# Patient Record
Sex: FEMALE | Race: WHITE | NOT HISPANIC OR LATINO
[De-identification: names, ages, dates, MRNs, and addresses within clinical notes are randomized per-mention and may not be internally consistent; named-entity substitution may affect disease eponyms.]

---

## 2017-12-31 ENCOUNTER — HEALTH MAINTENANCE LETTER (OUTPATIENT)
Age: 22
End: 2017-12-31

## 2018-11-10 ASSESSMENT — ENCOUNTER SYMPTOMS
NECK PAIN: 0
SORE THROAT: 1
TROUBLE SWALLOWING: 0
MYALGIAS: 0
DYSPNEA ON EXERTION: 1
COUGH: 1
ARTHRALGIAS: 1
MUSCLE CRAMPS: 0
MUSCLE WEAKNESS: 0
SPUTUM PRODUCTION: 1
HEMOPTYSIS: 0
WHEEZING: 1
TASTE DISTURBANCE: 0
POSTURAL DYSPNEA: 0
COUGH DISTURBING SLEEP: 1
SMELL DISTURBANCE: 0
HOARSE VOICE: 0
JOINT SWELLING: 1
NECK MASS: 0
SINUS CONGESTION: 1
STIFFNESS: 1
BACK PAIN: 0
SINUS PAIN: 0
SHORTNESS OF BREATH: 1
SNORES LOUDLY: 0

## 2018-11-13 ENCOUNTER — OFFICE VISIT (OUTPATIENT)
Dept: ORTHOPEDICS | Facility: CLINIC | Age: 23
End: 2018-11-13
Payer: COMMERCIAL

## 2018-11-13 ENCOUNTER — RADIANT APPOINTMENT (OUTPATIENT)
Dept: GENERAL RADIOLOGY | Facility: CLINIC | Age: 23
End: 2018-11-13
Attending: ORTHOPAEDIC SURGERY
Payer: COMMERCIAL

## 2018-11-13 VITALS — HEIGHT: 66 IN | WEIGHT: 132.2 LBS | BODY MASS INDEX: 21.24 KG/M2

## 2018-11-13 DIAGNOSIS — M25.562 CHRONIC PAIN OF LEFT KNEE: ICD-10-CM

## 2018-11-13 DIAGNOSIS — M25.362 PATELLOFEMORAL INSTABILITY OF LEFT KNEE WITH PAIN: Primary | ICD-10-CM

## 2018-11-13 DIAGNOSIS — G89.29 CHRONIC PAIN OF LEFT KNEE: ICD-10-CM

## 2018-11-13 DIAGNOSIS — M25.562 PATELLOFEMORAL INSTABILITY OF LEFT KNEE WITH PAIN: Primary | ICD-10-CM

## 2018-11-13 DIAGNOSIS — M25.562 CHRONIC PAIN OF LEFT KNEE: Primary | ICD-10-CM

## 2018-11-13 DIAGNOSIS — G89.29 CHRONIC PAIN OF LEFT KNEE: Primary | ICD-10-CM

## 2018-11-13 RX ORDER — METHIMAZOLE 5 MG/1
5 TABLET ORAL 2 TIMES DAILY
COMMUNITY
Start: 2016-09-16

## 2018-11-13 NOTE — LETTER
11/13/2018       RE: Gregoria Sung  [address] [facility] [address]     Dear Colleague,    Thank you for referring your patient, Gregoria Sung, to the [facility]. Please see a copy of my visit note below.    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  Bilateral patellar instability  2.  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  Patella shavonne  4.  Tobacco use    Plan:  Recommend surgery for patellar instability: MPFL reconstruction left knee, possible MPTL reconstruction   At this time, given her occupation, activity level and borderline shavonne/hyperextension, consideration for distal Tibial tubercle Osteotomy can be given, but doing a smaller surgery given the complexity of her home situation ( mother of a 1 year ole, in nursing school, etc); we believe a smaller surgery is best to do given its high chance of success in this setting.    Heavy counseling on smoking cessation; nicotine testing prior to surgery  Discussed risks and benefits of surgery including ~10% risk of re-dislocation in someone with hyperlaxity. Patient wishes to proceed.     Room time: 30 min, Consultation time: 20 min, mostly spent discussing the diagnosis and surgical recommendation, including potential complications and time line for return to function.    The patient was seen and examined by Dr. Teran who agrees with the plan above; seen for 30 minutes, greater of which 50% was spent in counseling the patient    Jorje Cortes MD 11/13/2018  Orthopaedic Surgery Resident, PGY-4  Pager: [phone]    I have  personally examined this patient and have reviewed the clinical presentation and progress note with the resident.  I agree with the treatment plan as outlined.  The plan was formulated with the resident on the day of the resident dictation.    Cayla Teran              History of Present Illness:   Patient was seen and examined by me. History, PMH, Meds, SH, complete ROS (10 organ systems) and PE reviewed with patient and prior medical records.      23-year-old female patient with a past medical history of Mohinder Danlos hyperlaxity syndrome, tobacco use disorder, and hypothyroidism who presents with a long-standing history of bilateral patellofemoral instability most symptom attic on her left side.  She is known to Dr. Teran for presentation back in  at which time she was scheduled to have M PFL reconstruction and possible M PTL reconstruction but had to be canceled.  She had a child around that time and out the child is old enough to walk she feels that she is ready to undergo the intended surgery.  She says now that her symptoms on the left thyroid have gotten worse and she has subluxation type instability events on a daily basis even during modest tasks.  She reports that the symptoms are also present on her right side but to a much lesser degree.  She does continue to smoke but in preparation for surgery she has already started to wean down.           Past Medical History:     Past Medical History:   Diagnosis Date     Complication of anesthesia 2016     Scoliosis      Tobacco use  HYPERthyroid  Hyperlaxity          Past Surgical History:     Past Surgical History:   Procedure Laterality Date     C STOMACH SURGERY PROCEDURE UNLISTED  2010 & 2013    Appendectomy and      KNEE SURGERY  2010    Lateral Release on left knee             Social History:     Social History     Social History     Marital status:      Spouse name: N/A     Number of children: N/A     Years of  education: N/A     Social History Main Topics     Smoking status: Current Some Day Smoker     Packs/day: 0.50     Years: 5.00     Types: Cigarettes     Start date: 8/3/2012     Smokeless tobacco: Not on file     Alcohol use 0.0 oz/week      Comment: Maybe once a month     Drug use: No     Sexual activity: Yes     Partners: Male     Birth control/ protection: None     Other Topics Concern     Not on file     Social History Narrative             Family History:     Family History   Problem Relation Age of Onset     Diabetes Maternal Grandmother      Diabetes Paternal Grandmother      Diabetes Father      Coronary Artery Disease Maternal Grandmother      Coronary Artery Disease Maternal Grandfather      Hypertension Mother      Hypertension Maternal Grandmother      Hypertension Maternal Grandfather      Hyperlipidemia Maternal Grandmother      Hyperlipidemia Maternal Grandfather      Breast Cancer Paternal Grandmother      Prostate Cancer Other      Great Grandmother     Other Cancer Mother      Cervical     Other Cancer Maternal Grandmother      Cervical     Other Cancer Other      Great Grandmother- Lung     Other Cancer Other      Great Grandmother-Leukemia     Anxiety Disorder Mother      Mental Illness Mother      Mental Illness Maternal Grandmother      Asthma Maternal Grandmother      Asthma Maternal Grandfather      Thyroid Disease Mother      Obesity Mother      Obesity Maternal Grandmother      Obesity Maternal Grandfather      Depression Maternal Grandfather      Migraines Mother      Rheumatoid Arthritis Other      Uncle     Hypothyroidism Mother      Low Back Problems Maternal Grandmother      Low Back Problems Maternal Grandfather      Spine Problems Father      Scoliosis     Spine Problems Mother      Scoliosis              Medications:     Current Outpatient Prescriptions   Medication Sig     levonorgestrel (MIRENA, 52 MG,) 20 MCG/24HR IUD      methimazole (TAPAZOLE) 5 MG tablet Take 5 mg by mouth 2  "times daily     Omeprazole (PRILOSEC PO)      No current facility-administered medications for this visit.              Allergies:      Allergies   Allergen Reactions     Iodine      Latex             Review of Systems:   A comprehensive 10 point review of systems (constitutional, ENT, cardiac, peripheral vascular, respiratory, GI, , Musculoskeletal, skin, Neurological) was performed and found to be negative except as described in this note.           Physical Exam:   COMPLETE EXAMINATION:   VITAL SIGNS: Ht 1.676 m (5' 6\")  Wt 60 kg (132 lb 3.2 oz)  BMI 21.34 kg/m2   GEN: NAD, smells of tobacco smoke  HEENT: poor dentition  RESP: Non labored breathing  SKIN: Grossly normal   MUSCULOSKELETAL:     Gait: patient walks with nonantalgic heel-toe gait.   They do achieve full extension at the knee. Hyper extension    left painful  Knee  Overall limb alignment is: Neutral  Effusion or swelling of the knee: none  Tenderness to palpation: yes, pain localizes to the patella  ROM:    Left: -12 to 155   Right: -14 to 155  Pain with knee ROM: mild  Crepitance with knee ROM: none  Extensor lag: none  MCL stability: Stable  Lateral Stability: Stable  Lachman: deferred  Posterior stability: stable  Pain with passive full hip range of motion: none  Patellar translation: 3+ laterally  Apprehension: appreciated  Medial patella tilt: mild  J-sign: soft J tracking noticeable    Prior surgical incision: none    Beighton criteria 9 of 9              Data:   3 view standing x-rays of the left knee and review of MRI of the left knee from 2016 demonstrate a 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  She is a CD ratio of 1.3 and modified I-S ratio of 1.0.  He has a well-maintained patellofemoral joint on x-ray.      Again, thank you for allowing me to participate in the care of your patient.      Sincerely,    Cayla Teran MD      "

## 2018-11-13 NOTE — PROGRESS NOTES
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  Bilateral patellar instability  2.  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  Patella shavonne  4.  Tobacco use    Plan:  Recommend surgery for patellar instability: MPFL reconstruction left knee, possible MPTL reconstruction   At this time, given her occupation, activity level and borderline shavonne/hyperextension, consideration for distal Tibial tubercle Osteotomy can be given, but doing a smaller surgery given the complexity of her home situation ( mother of a 1 year ole, in nursing school, etc); we believe a smaller surgery is best to do given its high chance of success in this setting.    Heavy counseling on smoking cessation; nicotine testing prior to surgery  Discussed risks and benefits of surgery including ~10% risk of re-dislocation in someone with hyperlaxity. Patient wishes to proceed.     Room time: 30 min, Consultation time: 20 min, mostly spent discussing the diagnosis and surgical recommendation, including potential complications and time line for return to function.    The patient was seen and examined by Dr. Teran who agrees with the plan above; seen for 30 minutes, greater of which 50% was spent in counseling the patient    Jorje Cortes MD 11/13/2018  Orthopaedic Surgery Resident, PGY-4  Pager: [phone]    I have personally examined this patient and have reviewed the clinical presentation and progress note with the resident.  I agree with the treatment plan as outlined.  The plan was formulated with the resident on the day of the resident dictation.    Cayla Teran              History of Present  Illness:   Patient was seen and examined by me. History, PMH, Meds, SH, complete ROS (10 organ systems) and PE reviewed with patient and prior medical records.      23-year-old female patient with a past medical history of Mohinder Danlos hyperlaxity syndrome, tobacco use disorder, and hypothyroidism who presents with a long-standing history of bilateral patellofemoral instability most symptom attic on her left side.  She is known to Dr. Teran for presentation back in  at which time she was scheduled to have M PFL reconstruction and possible M PTL reconstruction but had to be canceled.  She had a child around that time and out the child is old enough to walk she feels that she is ready to undergo the intended surgery.  She says now that her symptoms on the left thyroid have gotten worse and she has subluxation type instability events on a daily basis even during modest tasks.  She reports that the symptoms are also present on her right side but to a much lesser degree.  She does continue to smoke but in preparation for surgery she has already started to wean down.           Past Medical History:     Past Medical History:   Diagnosis Date     Complication of anesthesia 2016     Scoliosis      Tobacco use  HYPERthyroid  Hyperlaxity          Past Surgical History:     Past Surgical History:   Procedure Laterality Date     C STOMACH SURGERY PROCEDURE UNLISTED  2010 & 2013    Appendectomy and      KNEE SURGERY  2010    Lateral Release on left knee             Social History:     Social History     Social History     Marital status:      Spouse name: N/A     Number of children: N/A     Years of education: N/A     Social History Main Topics     Smoking status: Current Some Day Smoker     Packs/day: 0.50     Years: 5.00     Types: Cigarettes     Start date: 8/3/2012     Smokeless tobacco: Not on file     Alcohol use 0.0 oz/week      Comment: Maybe once a month     Drug use: No     Sexual  activity: Yes     Partners: Male     Birth control/ protection: None     Other Topics Concern     Not on file     Social History Narrative             Family History:     Family History   Problem Relation Age of Onset     Diabetes Maternal Grandmother      Diabetes Paternal Grandmother      Diabetes Father      Coronary Artery Disease Maternal Grandmother      Coronary Artery Disease Maternal Grandfather      Hypertension Mother      Hypertension Maternal Grandmother      Hypertension Maternal Grandfather      Hyperlipidemia Maternal Grandmother      Hyperlipidemia Maternal Grandfather      Breast Cancer Paternal Grandmother      Prostate Cancer Other      Great Grandmother     Other Cancer Mother      Cervical     Other Cancer Maternal Grandmother      Cervical     Other Cancer Other      Great Grandmother- Lung     Other Cancer Other      Great Grandmother-Leukemia     Anxiety Disorder Mother      Mental Illness Mother      Mental Illness Maternal Grandmother      Asthma Maternal Grandmother      Asthma Maternal Grandfather      Thyroid Disease Mother      Obesity Mother      Obesity Maternal Grandmother      Obesity Maternal Grandfather      Depression Maternal Grandfather      Migraines Mother      Rheumatoid Arthritis Other      Uncle     Hypothyroidism Mother      Low Back Problems Maternal Grandmother      Low Back Problems Maternal Grandfather      Spine Problems Father      Scoliosis     Spine Problems Mother      Scoliosis              Medications:     Current Outpatient Prescriptions   Medication Sig     levonorgestrel (MIRENA, 52 MG,) 20 MCG/24HR IUD      methimazole (TAPAZOLE) 5 MG tablet Take 5 mg by mouth 2 times daily     Omeprazole (PRILOSEC PO)      No current facility-administered medications for this visit.              Allergies:      Allergies   Allergen Reactions     Iodine      Latex             Review of Systems:   A comprehensive 10 point review of systems (constitutional, ENT, cardiac,  "peripheral vascular, respiratory, GI, , Musculoskeletal, skin, Neurological) was performed and found to be negative except as described in this note.           Physical Exam:   COMPLETE EXAMINATION:   VITAL SIGNS: Ht 1.676 m (5' 6\")  Wt 60 kg (132 lb 3.2 oz)  BMI 21.34 kg/m2   GEN: NAD, smells of tobacco smoke  HEENT: poor dentition  RESP: Non labored breathing  SKIN: Grossly normal   MUSCULOSKELETAL:     Gait: patient walks with nonantalgic heel-toe gait.   They do achieve full extension at the knee. Hyper extension    left painful  Knee  Overall limb alignment is: Neutral  Effusion or swelling of the knee: none  Tenderness to palpation: yes, pain localizes to the patella  ROM:    Left: -12 to 155   Right: -14 to 155  Pain with knee ROM: mild  Crepitance with knee ROM: none  Extensor lag: none  MCL stability: Stable  Lateral Stability: Stable  Lachman: deferred  Posterior stability: stable  Pain with passive full hip range of motion: none  Patellar translation: 3+ laterally  Apprehension: appreciated  Medial patella tilt: mild  J-sign: soft J tracking noticeable    Prior surgical incision: none    Beighton criteria 9 of 9              Data:   3 view standing x-rays of the left knee and review of MRI of the left knee from 2016 demonstrate a 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  She is a CD ratio of 1.3 and modified I-S ratio of 1.0.  He has a well-maintained patellofemoral joint on x-ray.    Answers for HPI/ROS submitted by the patient on 11/10/2018   General Symptoms: No  Skin Symptoms: No  HENT Symptoms: Yes  EYE SYMPTOMS: No  HEART SYMPTOMS: No  LUNG SYMPTOMS: Yes  INTESTINAL SYMPTOMS: No  URINARY SYMPTOMS: No  GYNECOLOGIC SYMPTOMS: No  BREAST SYMPTOMS: No  SKELETAL SYMPTOMS: Yes  BLOOD SYMPTOMS: No  NERVOUS SYSTEM SYMPTOMS: No  MENTAL HEALTH SYMPTOMS: No  Ear pain: No  Ear discharge: No  Hearing loss: No  Tinnitus: No  Nosebleeds: No  Congestion: Yes  Sinus pain: No  Trouble " swallowing: No   Voice hoarseness: No  Mouth sores: No  Sore throat: Yes  Tooth pain: No  Gum tenderness: No  Bleeding gums: No  Change in taste: No  Change in sense of smell: No  Dry mouth: No  Hearing aid used: No  Neck lump: No  Cough: Yes  Sputum or phlegm: Yes  Coughing up blood: No  Difficulty breating or shortness of breath: Yes  Snoring: No  Wheezing: Yes  Difficulty breathing on exertion: Yes  Nighttime Cough: Yes  Difficulty breathing when lying flat: No  Back pain: No  Muscle aches: No  Neck pain: No  Swollen joints: Yes  Joint pain: Yes  Bone pain: No  Muscle cramps: No  Muscle weakness: No  Joint stiffness: Yes  Bone fracture: No  PHQ-2 Score: 0

## 2018-11-13 NOTE — NURSING NOTE
"Reason For Visit:   Chief Complaint   Patient presents with     Left Knee - Pain       Primary MD: Cayla Mejia  Referring MD: Self referred    ?  No  Occupation .  Currently working? Yes.  Work status?  Full time.  Type of injury: chronic .  Date of surgery: 2010  Type of surgery: LRL .  Smoker: Yes      Ht 1.676 m (5' 6\")  Wt 60 kg (132 lb 3.2 oz)  BMI 21.34 kg/m2    Pain Assessment  Patient Currently in Pain: Yes  0-10 Pain Scale: 4  Primary Pain Location: Knee  Pain Orientation: Left  Pain Descriptors: Aching, Constant  Aggravating Factors: Other (comment), Walking (cold weather)    "

## 2018-11-13 NOTE — MR AVS SNAPSHOT
"              After Visit Summary   11/13/2018    Gregoria Sung    MRN: [id number]           Patient Information     Date Of Birth          1995        Visit Information        Provider Department      11/13/2018 3:30 PM [facility]an [facility]        Today's Diagnoses     Patellofemoral instability of left knee with pain    -  1       Follow-ups after your visit        Who to contact     Please call your clinic at [phone] to:    Ask questions about your health    Make or cancel appointments    Discuss your medicines    Learn about your test results    Speak to your doctor            Additional Information About Your Visit        MyChart Information     [facility] gives you secure access to your electronic health record. If you see a primary care provider, you can also send messages to your care team and make appointments. If you have questions, please call your primary care clinic.  If you do not have a primary care provider, please call [phone] and they will assist you.      [facility] is an electronic gateway that provides easy, online access to your medical records. With [facility], you can request a clinic appointment, read your test results, renew a prescription or communicate with your care team.     To access your existing account, please contact your [facility] or call [phone] for assistance.        Care EveryWhere ID     This is your Care EveryWhere ID. This could be used by other organizations to access your [facility] medical records  [id number]        Your Vitals Were     Height BMI (Body Mass Index)                1.676 m (5' 6\") 21.34 kg/m2           Blood Pressure from Last 3 Encounters:   08/19/16 127/71    Weight from Last 3 Encounters:   11/13/18 60 kg (132 lb 3.2 oz)   08/19/16 57.7 kg (127 lb 4.8 oz)   07/26/16 57.7 kg (127 lb 4.8 oz)              Today, you had the following     No orders found for display       Primary Care " Provider Office Phone # Fax #    Cayla Mejia [phone]       [facility]  [address] [facility] [address]        Equal Access to Services     ANDREI VEE : Darling lopes daleo Soraquelali, waaxda luqadaha, qaybta kaalmada adebryce, marie gonzalez laBrittanydeanne dia. So [facility] [phone].    ATENCIÓN: Si habla español, tiene a lyon disposición servicios gratuitos de asistencia lingüística. Llame al [phone].    We comply with applicable federal civil rights laws and [place] laws. We do not discriminate on the basis of race, color, national origin, age, disability, sex, sexual orientation, or gender identity.            Thank you!     Thank you for choosing [facility]  for your care. Our goal is always to provide you with excellent care. Hearing back from our patients is one way we can continue to improve our services. Please take a few minutes to complete the written survey that you may receive in the mail after your visit with us. Thank you!             Your Updated Medication List - Protect others around you: Learn how to safely use, store and throw away your medicines at www.disposemymeds.org.          This list is accurate as of 11/13/18  6:13 PM.  Always use your most recent med list.                   Brand Name Dispense Instructions for use Diagnosis    methimazole 5 MG tablet    TAPAZOLE     Take 5 mg by mouth 2 times daily        MIRENA (52 MG) 20 MCG/24HR IUD   Generic drug:  levonorgestrel           PRILOSEC PO

## 2018-11-13 NOTE — NURSING NOTE
Teaching Flowsheet   Relevant Diagnosis: Left knee pain/instability  Teaching Topic: Left knee MPFL reconstruction, possible MPTL reconstruction     Person(s) involved in teaching:   Patient and      Motivation Level:  Asks Questions: Yes  Eager to Learn: Yes  Cooperative: Yes  Receptive (willing/able to accept information): Yes  Any cultural factors/Mu-ism beliefs that may influence understanding or compliance? No     Patient demonstrates understanding of the following:  Reason for the appointment, diagnosis and treatment plan: Yes  Knowledge of proper use of medications and conditions for which they are ordered (with special attention to potential side effects or drug interactions): Yes  Which situations necessitate calling provider and whom to contact: Yes     Teaching Concerns Addressed:   Comments: Patient understands she will need a pre-op H&P within 30 days of surgery.  She will get a nicotine blood test done with this appointment.        Nutritional needs and diet plan: Yes  Pain management techniques: Yes  Wound Care: Yes  How and/when to access community resources: Yes     Instructional Materials Used/Given: Pre-op packet given and reviewed with patient and .  Antiseptic soap given.  Patient will be called regarding a January surgery date.  She will have her pre-op near home along with her first post op visit for a suture removal.  She understands she needs to set up PT to start within one week of surgery.      Time spent with patient: 15 minutes.

## 2019-01-24 ENCOUNTER — ANESTHESIA EVENT (OUTPATIENT)
Dept: SURGERY | Facility: AMBULATORY SURGERY CENTER | Age: 24
End: 2019-01-24

## 2019-01-25 ENCOUNTER — HOSPITAL ENCOUNTER (OUTPATIENT)
Facility: AMBULATORY SURGERY CENTER | Age: 24
End: 2019-01-25
Attending: ORTHOPAEDIC SURGERY
Payer: COMMERCIAL

## 2019-01-25 ENCOUNTER — ANCILLARY PROCEDURE (OUTPATIENT)
Dept: RADIOLOGY | Facility: AMBULATORY SURGERY CENTER | Age: 24
End: 2019-01-25
Payer: COMMERCIAL

## 2019-01-25 ENCOUNTER — ANESTHESIA (OUTPATIENT)
Dept: SURGERY | Facility: AMBULATORY SURGERY CENTER | Age: 24
End: 2019-01-25

## 2019-01-25 VITALS
SYSTOLIC BLOOD PRESSURE: 106 MMHG | RESPIRATION RATE: 16 BRPM | HEART RATE: 93 BPM | DIASTOLIC BLOOD PRESSURE: 62 MMHG | TEMPERATURE: 98.3 F | OXYGEN SATURATION: 97 % | WEIGHT: 132 LBS | HEIGHT: 65 IN | BODY MASS INDEX: 21.99 KG/M2

## 2019-01-25 DIAGNOSIS — M25.569 KNEE PAIN: ICD-10-CM

## 2019-01-25 DIAGNOSIS — Z98.890 STATUS POST KNEE SURGERY: Primary | ICD-10-CM

## 2019-01-25 LAB
HCG UR QL: NEGATIVE
HCG UR QL: POSITIVE
INTERNAL QC OK POCT: YES
INTERNAL QC OK POCT: YES

## 2019-01-25 DEVICE — IMPLANTABLE DEVICE: Type: IMPLANTABLE DEVICE | Site: KNEE | Status: FUNCTIONAL

## 2019-01-25 RX ORDER — KETOROLAC TROMETHAMINE 30 MG/ML
INJECTION, SOLUTION INTRAMUSCULAR; INTRAVENOUS PRN
Status: DISCONTINUED | OUTPATIENT
Start: 2019-01-25 | End: 2019-01-25

## 2019-01-25 RX ORDER — NALOXONE HYDROCHLORIDE 0.4 MG/ML
.1-.4 INJECTION, SOLUTION INTRAMUSCULAR; INTRAVENOUS; SUBCUTANEOUS
Status: DISCONTINUED | OUTPATIENT
Start: 2019-01-25 | End: 2019-01-26 | Stop reason: HOSPADM

## 2019-01-25 RX ORDER — PROPOFOL 10 MG/ML
INJECTION, EMULSION INTRAVENOUS PRN
Status: DISCONTINUED | OUTPATIENT
Start: 2019-01-25 | End: 2019-01-25

## 2019-01-25 RX ORDER — FENTANYL CITRATE 50 UG/ML
25-50 INJECTION, SOLUTION INTRAMUSCULAR; INTRAVENOUS
Status: DISCONTINUED | OUTPATIENT
Start: 2019-01-25 | End: 2019-01-25 | Stop reason: HOSPADM

## 2019-01-25 RX ORDER — ONDANSETRON 2 MG/ML
INJECTION INTRAMUSCULAR; INTRAVENOUS PRN
Status: DISCONTINUED | OUTPATIENT
Start: 2019-01-25 | End: 2019-01-25

## 2019-01-25 RX ORDER — BUPIVACAINE HYDROCHLORIDE 2.5 MG/ML
INJECTION, SOLUTION EPIDURAL; INFILTRATION; INTRACAUDAL PRN
Status: DISCONTINUED | OUTPATIENT
Start: 2019-01-25 | End: 2019-01-25

## 2019-01-25 RX ORDER — CEFAZOLIN SODIUM 2 G/50ML
2 SOLUTION INTRAVENOUS
Status: COMPLETED | OUTPATIENT
Start: 2019-01-25 | End: 2019-01-25

## 2019-01-25 RX ORDER — SODIUM CHLORIDE, SODIUM LACTATE, POTASSIUM CHLORIDE, CALCIUM CHLORIDE 600; 310; 30; 20 MG/100ML; MG/100ML; MG/100ML; MG/100ML
INJECTION, SOLUTION INTRAVENOUS CONTINUOUS
Status: DISCONTINUED | OUTPATIENT
Start: 2019-01-25 | End: 2019-01-26 | Stop reason: HOSPADM

## 2019-01-25 RX ORDER — CEFAZOLIN SODIUM 1 G/50ML
1 SOLUTION INTRAVENOUS SEE ADMIN INSTRUCTIONS
Status: DISCONTINUED | OUTPATIENT
Start: 2019-01-25 | End: 2019-01-25 | Stop reason: HOSPADM

## 2019-01-25 RX ORDER — OXYCODONE HYDROCHLORIDE 5 MG/1
5-10 TABLET ORAL EVERY 4 HOURS PRN
Qty: 20 TABLET | Refills: 0 | Status: SHIPPED | OUTPATIENT
Start: 2019-01-25 | End: 2019-01-28

## 2019-01-25 RX ORDER — MEPERIDINE HYDROCHLORIDE 25 MG/ML
12.5 INJECTION INTRAMUSCULAR; INTRAVENOUS; SUBCUTANEOUS
Status: DISCONTINUED | OUTPATIENT
Start: 2019-01-25 | End: 2019-01-26 | Stop reason: HOSPADM

## 2019-01-25 RX ORDER — ONDANSETRON 4 MG/1
4-8 TABLET, ORALLY DISINTEGRATING ORAL EVERY 8 HOURS PRN
Qty: 4 TABLET | Refills: 0 | Status: SHIPPED | OUTPATIENT
Start: 2019-01-25 | End: 2019-02-01

## 2019-01-25 RX ORDER — PROPOFOL 10 MG/ML
INJECTION, EMULSION INTRAVENOUS CONTINUOUS PRN
Status: DISCONTINUED | OUTPATIENT
Start: 2019-01-25 | End: 2019-01-25

## 2019-01-25 RX ORDER — DEXAMETHASONE SODIUM PHOSPHATE 4 MG/ML
INJECTION, SOLUTION INTRA-ARTICULAR; INTRALESIONAL; INTRAMUSCULAR; INTRAVENOUS; SOFT TISSUE PRN
Status: DISCONTINUED | OUTPATIENT
Start: 2019-01-25 | End: 2019-01-25

## 2019-01-25 RX ORDER — ACETAMINOPHEN 325 MG/1
975 TABLET ORAL ONCE
Status: COMPLETED | OUTPATIENT
Start: 2019-01-25 | End: 2019-01-25

## 2019-01-25 RX ORDER — NALOXONE HYDROCHLORIDE 0.4 MG/ML
.1-.4 INJECTION, SOLUTION INTRAMUSCULAR; INTRAVENOUS; SUBCUTANEOUS
Status: DISCONTINUED | OUTPATIENT
Start: 2019-01-25 | End: 2019-01-25 | Stop reason: HOSPADM

## 2019-01-25 RX ORDER — LIDOCAINE 40 MG/G
CREAM TOPICAL
Status: DISCONTINUED | OUTPATIENT
Start: 2019-01-25 | End: 2019-01-25 | Stop reason: HOSPADM

## 2019-01-25 RX ORDER — ACETAMINOPHEN 325 MG/1
975 TABLET ORAL ONCE
Status: DISCONTINUED | OUTPATIENT
Start: 2019-01-25 | End: 2019-01-25 | Stop reason: HOSPADM

## 2019-01-25 RX ORDER — GABAPENTIN 300 MG/1
300 CAPSULE ORAL ONCE
Status: COMPLETED | OUTPATIENT
Start: 2019-01-25 | End: 2019-01-25

## 2019-01-25 RX ORDER — SODIUM CHLORIDE, SODIUM LACTATE, POTASSIUM CHLORIDE, CALCIUM CHLORIDE 600; 310; 30; 20 MG/100ML; MG/100ML; MG/100ML; MG/100ML
INJECTION, SOLUTION INTRAVENOUS CONTINUOUS
Status: DISCONTINUED | OUTPATIENT
Start: 2019-01-25 | End: 2019-01-25 | Stop reason: HOSPADM

## 2019-01-25 RX ORDER — ONDANSETRON 4 MG/1
4 TABLET, ORALLY DISINTEGRATING ORAL EVERY 30 MIN PRN
Status: DISCONTINUED | OUTPATIENT
Start: 2019-01-25 | End: 2019-01-26 | Stop reason: HOSPADM

## 2019-01-25 RX ORDER — FLUMAZENIL 0.1 MG/ML
0.2 INJECTION, SOLUTION INTRAVENOUS
Status: DISCONTINUED | OUTPATIENT
Start: 2019-01-25 | End: 2019-01-25 | Stop reason: HOSPADM

## 2019-01-25 RX ORDER — GABAPENTIN 300 MG/1
300 CAPSULE ORAL ONCE
Status: DISCONTINUED | OUTPATIENT
Start: 2019-01-25 | End: 2019-01-25 | Stop reason: HOSPADM

## 2019-01-25 RX ORDER — AMOXICILLIN 250 MG
1-2 CAPSULE ORAL 2 TIMES DAILY
Qty: 16 TABLET | Refills: 0 | Status: SHIPPED | OUTPATIENT
Start: 2019-01-25

## 2019-01-25 RX ORDER — ONDANSETRON 2 MG/ML
4 INJECTION INTRAMUSCULAR; INTRAVENOUS EVERY 30 MIN PRN
Status: DISCONTINUED | OUTPATIENT
Start: 2019-01-25 | End: 2019-01-26 | Stop reason: HOSPADM

## 2019-01-25 RX ORDER — OXYCODONE HYDROCHLORIDE 5 MG/1
5 TABLET ORAL EVERY 4 HOURS PRN
Status: DISCONTINUED | OUTPATIENT
Start: 2019-01-25 | End: 2019-01-26 | Stop reason: HOSPADM

## 2019-01-25 RX ORDER — HYDROMORPHONE HYDROCHLORIDE 1 MG/ML
.3-.5 INJECTION, SOLUTION INTRAMUSCULAR; INTRAVENOUS; SUBCUTANEOUS EVERY 10 MIN PRN
Status: DISCONTINUED | OUTPATIENT
Start: 2019-01-25 | End: 2019-01-26 | Stop reason: HOSPADM

## 2019-01-25 RX ORDER — ACETAMINOPHEN 325 MG/1
650 TABLET ORAL EVERY 4 HOURS
Qty: 120 TABLET | Refills: 0 | Status: SHIPPED | OUTPATIENT
Start: 2019-01-25 | End: 2020-01-25

## 2019-01-25 RX ADMIN — Medication 0.5 MG: at 09:44

## 2019-01-25 RX ADMIN — FENTANYL CITRATE 25 MCG: 50 INJECTION, SOLUTION INTRAMUSCULAR; INTRAVENOUS at 11:40

## 2019-01-25 RX ADMIN — FENTANYL CITRATE 25 MCG: 50 INJECTION, SOLUTION INTRAMUSCULAR; INTRAVENOUS at 11:47

## 2019-01-25 RX ADMIN — KETOROLAC TROMETHAMINE 30 MG: 30 INJECTION, SOLUTION INTRAMUSCULAR; INTRAVENOUS at 10:56

## 2019-01-25 RX ADMIN — PROPOFOL 100 MCG/KG/MIN: 10 INJECTION, EMULSION INTRAVENOUS at 09:00

## 2019-01-25 RX ADMIN — FENTANYL CITRATE 25 MCG: 50 INJECTION, SOLUTION INTRAMUSCULAR; INTRAVENOUS at 11:43

## 2019-01-25 RX ADMIN — OXYCODONE HYDROCHLORIDE 5 MG: 5 TABLET ORAL at 11:41

## 2019-01-25 RX ADMIN — FENTANYL CITRATE 25 MCG: 50 INJECTION, SOLUTION INTRAMUSCULAR; INTRAVENOUS at 11:51

## 2019-01-25 RX ADMIN — GABAPENTIN 300 MG: 300 CAPSULE ORAL at 07:46

## 2019-01-25 RX ADMIN — SODIUM CHLORIDE, SODIUM LACTATE, POTASSIUM CHLORIDE, CALCIUM CHLORIDE: 600; 310; 30; 20 INJECTION, SOLUTION INTRAVENOUS at 08:53

## 2019-01-25 RX ADMIN — CEFAZOLIN SODIUM 2 G: 2 SOLUTION INTRAVENOUS at 08:53

## 2019-01-25 RX ADMIN — SODIUM CHLORIDE, SODIUM LACTATE, POTASSIUM CHLORIDE, CALCIUM CHLORIDE: 600; 310; 30; 20 INJECTION, SOLUTION INTRAVENOUS at 07:46

## 2019-01-25 RX ADMIN — BUPIVACAINE HYDROCHLORIDE 10 ML: 2.5 INJECTION, SOLUTION EPIDURAL; INFILTRATION; INTRACAUDAL at 08:35

## 2019-01-25 RX ADMIN — DEXAMETHASONE SODIUM PHOSPHATE 4 MG: 4 INJECTION, SOLUTION INTRA-ARTICULAR; INTRALESIONAL; INTRAMUSCULAR; INTRAVENOUS; SOFT TISSUE at 09:00

## 2019-01-25 RX ADMIN — FENTANYL CITRATE 50 MCG: 50 INJECTION, SOLUTION INTRAMUSCULAR; INTRAVENOUS at 08:07

## 2019-01-25 RX ADMIN — ACETAMINOPHEN 975 MG: 325 TABLET ORAL at 07:46

## 2019-01-25 RX ADMIN — ONDANSETRON 4 MG: 2 INJECTION INTRAMUSCULAR; INTRAVENOUS at 09:00

## 2019-01-25 RX ADMIN — PROPOFOL 200 MG: 10 INJECTION, EMULSION INTRAVENOUS at 09:00

## 2019-01-25 ASSESSMENT — MIFFLIN-ST. JEOR: SCORE: 1354.63

## 2019-01-25 ASSESSMENT — LIFESTYLE VARIABLES: TOBACCO_USE: 1

## 2019-01-25 NOTE — ANESTHESIA PROCEDURE NOTES
Peripheral Nerve Block Procedure Note    Staff:     Anesthesiologist:  Jaun George MD    Resident/CRNA:  Jorge Horton MD    Block performed by resident/CRNA in the presence of a teaching physician    Location: Pre-op  Procedure Start/Stop TImes:      1/25/2019 8:34 AM     1/25/2019 8:36 AM    patient identified, IV checked, site marked, risks and benefits discussed, informed consent, monitors and equipment checked, pre-op evaluation, at physician/surgeon's request and post-op pain management      Correct Patient: Yes      Correct Position: Yes      Correct Site: Yes      Correct Procedure: Yes      Correct Laterality:  Yes    Site Marked:  Yes  Procedure details:     Procedure:  Adductor canal    ASA:  2    Diagnosis:  Post operative pain control    Laterality:  Left    Position:  Supine    Sterile Prep: chloraprep, mask and sterile gloves      Needle:  Short bevel    Needle gauge:  21    Needle length (mm):  100    Ultrasound: Yes      Ultrasound used to identify targeted nerve, plexus, or vascular structure and placed a needle adjacent to it      Permanent Image entered into patiient's record      Abnormal pain on injection: No      Blood Aspirated: No      Paresthesias:  No    Bleeding at site: No      Bolus via:  Needle    Infusion Method:  Single Shot    Complications:  None  Assessment/Narrative:     Injection made incrementally with aspirations every (mL):  5

## 2019-01-25 NOTE — PROGRESS NOTES
Left adductor block done preoperatively.  VSS, ON 2L NC, sats %.  Tolerated procedure without problems.

## 2019-01-25 NOTE — ANESTHESIA PREPROCEDURE EVALUATION
Anesthesia Pre-Procedure Evaluation    Patient: Gregoria Sung   MRN:     [id number] Gender:   female   Age:    23 year old :      1995        Preoperative Diagnosis: Patella Instability   Procedure(s):  Left Knee Medial Patellofemoral Ligament Reconstruction +/- Arthroscopy +/- Lateral Retincular Lengthening Possible Medial Patellotibial Ligament     Past Medical History:   Diagnosis Date     Complication of anesthesia 2016     Scoliosis       Past Surgical History:   Procedure Laterality Date     C STOMACH SURGERY PROCEDURE UNLISTED  2010 & 2013    Appendectomy and      KNEE SURGERY  2010    Lateral Release on left knee          Anesthesia Evaluation     . Pt has had prior anesthetic. Type: Regional and General    No history of anesthetic complications          ROS/MED HX    ENT/Pulmonary:     (+)tobacco use, Past use Intermittent Treatment: Inhaler prn,  , . .    Neurologic:  - neg neurologic ROS     Cardiovascular:  - neg cardiovascular ROS       METS/Exercise Tolerance:  >4 METS   Hematologic:  - neg hematologic  ROS       Musculoskeletal:   (+) , , other musculoskeletal- ehler-danlos      GI/Hepatic:  - neg GI/hepatic ROS       Renal/Genitourinary:  - ROS Renal section negative       Endo:     (+) thyroid problem  hyperthyroidism, .      Psychiatric:  - neg psychiatric ROS       Infectious Disease:  - neg infectious disease ROS       Malignancy:      - no malignancy   Other:    (+) No chance of pregnancy no H/O Chronic Pain,                       PHYSICAL EXAM:   Mental Status/Neuro: A/A/O   Airway: Facies: Feasible  Mallampati: II  Mouth/Opening: Full  TM distance: > 6 cm  Neck ROM: Full   Respiratory: Auscultation: CTAB     Resp. Rate: Normal     Resp. Effort: Normal      CV: Rhythm: Regular  Rate: Age appropriate  Heart: Normal Sounds   Comments:      Dental: Normal                  Lab Results   Component Value Date    HCG Positive 2019    HCGS Positive (A) 2016  "      Preop Vitals  BP Readings from Last 3 Encounters:   01/25/19 109/75   08/19/16 127/71    Pulse Readings from Last 3 Encounters:   01/25/19 76      Resp Readings from Last 3 Encounters:   01/25/19 16   08/19/16 16    SpO2 Readings from Last 3 Encounters:   01/25/19 100%   08/19/16 100%      Temp Readings from Last 1 Encounters:   01/25/19 36.7  C (98.1  F) (Oral)    Ht Readings from Last 1 Encounters:   01/25/19 1.651 m (5' 5\")      Wt Readings from Last 1 Encounters:   01/25/19 59.9 kg (132 lb)    Estimated body mass index is 21.97 kg/m  as calculated from the following:    Height as of this encounter: 1.651 m (5' 5\").    Weight as of this encounter: 59.9 kg (132 lb).     LDA:  Peripheral IV 01/25/19 Right Hand (Active)   Site Assessment WDL 1/25/2019  7:55 AM   Line Status Infusing 1/25/2019  7:55 AM   Phlebitis Scale 0-->no symptoms 1/25/2019  7:55 AM   Infiltration Scale 0 1/25/2019  7:55 AM   Extravasation? No 1/25/2019  7:55 AM   Number of days: 0       Airway - Adult/Peds laryngeal mask airway (Active)   Number of days: 0            Assessment:   ASA SCORE: 2    NPO Status: > 6 hours since completed Solid [employer]   Documentation: H&P complete; Preop Testing complete; Consents complete   Proceeding: Proceed without further delay  Tobacco Use:  NO Active use of Tobacco/UNKNOWN Tobacco use status     Plan:   Anes. Type:  General; Regional     RA Details:  FOR POSTOP PAIN CONTROL; Pre Induction; L; Exparel; SS     RA-Location/Type: Nerve Block; Adductor canal   Pre-Induction: Midazolam IV; Acetaminophen PO   Induction:  IV (Standard)   Airway: LMA   Access/Monitoring: PIV   Maintenance: Balanced   Emergence: Procedure Site   Logistics: Same Day Surgery     Postop Pain/Sedation Strategy:  Standard-Options: Opioids PRN     PONV Management:  Adult Risk Factors: Female, Non-Smoker, Postop Opioids  Prevention: Ondansetron; Dexamethasone     CONSENT: Direct conversation   Plan and risks discussed with: Patient    "                         Jorge Horton MD

## 2019-01-25 NOTE — LETTER
January 25, 2019      RE: Gregoria Sung  [address] [place] [address]         To whom it may concern:    Gregoria Sung underwent a surgical procedure with me on 01/25/2019 at the [facility]. She may return to work on 02/11/2019.       In advance, thank you for your cooperation. Please don't hesitate to contact me with any questions or concerns.        Sincerely,        ANGELITO Harrison MD  [facility]  Department of Orthopedic Surgery

## 2019-01-25 NOTE — ANESTHESIA CARE TRANSFER NOTE
Patient: Gregoria Sung    Procedure(s):  Left Knee Arthroscopy,  Medial Patellofemoral Ligament Reconstruction, Medial Patellotibial Ligament Reconstruction    Diagnosis: Patella Instability  Diagnosis Additional Information: No value filed.    Anesthesia Type:   No value filed.     Note:  Airway :Room Air  Patient transferred to:PACU  Comments: Patient awake and breathing spont. VSS. No complaints of pain in knee. RN to treat. Report to RNHandoff Report: Identifed the Patient, Identified the Reponsible Provider, Reviewed the pertinent medical history, Discussed the surgical course, Reviewed Intra-OP anesthesia mangement and issues during anesthesia, Set expectations for post-procedure period and Allowed opportunity for questions and acknowledgement of understanding      Vitals: (Last set prior to Anesthesia Care Transfer)    CRNA VITALS  1/25/2019 1059 - 1/25/2019 1140      1/25/2019             Pulse:  92    Ht Rate:  93    SpO2:  97 %    Resp Rate (set):  10                Electronically Signed By: JOHN PAUL Plummer CRNA  January 25, 2019  11:40 AM

## 2019-01-25 NOTE — DISCHARGE INSTRUCTIONS
"[facility]  Home Care Following Anesthesia  For 24 hours after surgery:  1. Get plenty of rest.  A responsible adult must stay with you for at least 24 hours after you leave the surgery center.  2. Do not drive or use heavy equipment.  If you have weakness or tingling, don't drive or use heavy equipment until this feeling goes away.   3. Do not drink alcohol.   4. Avoid strenuous or risky activities.  Ask for help when climbing stairs.  5. You may feel lightheaded.  IF so, sit for a few minutes before standing.  Have someone help you get up.   6. If you have nausea (feel sick to your stomach): Drink only clear liquids such as apple juice, ginger ale, broth or 7-Up.  Rest may also help.  Be sure to drink enough fluids.  Move to a regular diet as you feel able.   7. You may have a slight fever.  Call the doctor if your fever is over 100 F (37.7 C) (taken under the tongue) or lasts longer than 24 hours.  8. You may have a dry mouth, a sore throat, muscle aches or trouble sleeping. These should go away after 24 hours.  9. Do not make important or legal decisions.   Today you received an Exparel block to numb the nerves near your surgery site.  This is a block using local anesthetic or \"numbing\" medication injected around the nerves to anesthetize or \"numb\" the area supplied by those nerves.  This block is injected into the muscle layer near your surgical site.  This medication may numb the location where you had surgery up to 72 hours.  If your surgical site is an arm or leg you should be careful with your affected limb, since it is possible to injure your limb without being aware of it due to the numbing.  Until full feeling returns, you should guard against bumping or hitting your limb, and avoid extreme hot or cold temperatures on the skin.  As the block wears off, the feeling will return as a tingling or prickly sensation near your surgical site.  You will experince more " discomfort from your incision as the feeling returns.  You may want to take a pain pill (a narcotic or Tylenol if this was prescribed by your surgeon) when you start to experience mild pain before the pain beomes more severe.  If your pain medications do not control your pain, you should notify your surgeon.    Tips for taking pain medications  To get the best pain relief possible, remember these points:    Take pain medications as directed, before pain becomes severe.    Pain medication can upset your stomach: taking it with food may help.    Constipation is a common side effect of pain medication. Drink plenty of  fluids.    Eat foods high in fiber. Take a stool softener if recommended by your doctor or pharmacist.    Do not drink alcohol, drive or operate machinery while taking pain medications.    Ask about other ways to control pain, such as with heat, ice or relaxation.    Tylenol/Acetaminophen Consumption  To help encourage the safe use of acetaminophen, the makers of TYLENOL  have lowered the maximum daily dose for single-ingredient Extra Strength TYLENOL  (acetaminophen) products sold in the [place] from 8 pills per day (4,000 mg) to 6 pills per day (3,000 mg). The dosing interval has also changed from 2 pills every 4-6 hours to 2 pills every 6 hours.    If you feel your pain relief is insufficient, you may take Tylenol/Acetaminophen in addition to your narcotic pain medication.     Be careful not to exceed 3,000 mg of Tylenol/Acetaminophen in a 24 hour period from all sources.    If you are taking extra strength Tylenol/acetaminophen (500 mg), the maximum dose is 6 tablets in 24 hours.    If you are taking regular strength acetaminophen (325 mg), the maximum dose is 9 tablets in 24 hours.    Tylenol 975mg given at 7:46am; next dose available after 1:45pm. Then follow package instructions.     Call a doctor for any of the followin. Signs of infection (fever, growing tenderness at the surgery site, a large  "amount of drainage or bleeding, severe pain, foul-smelling drainage, redness, swelling).  2. It has been over 8 to 10 hours since surgery and you are still not able to urinate (pass water).  3. Headache for over 24 hours.    Your doctor is:  Dr. Cayla Teran, Orthopaedics: [phone]                  Or dial [phone] and ask for the resident on call for:  Orthopaedics  For emergency care, call the:  [facility] Emergency Department: [phone] (TTY for hearing impaired: [phone])  Information about liposomal bupivacaine (Exparel)    What is Liposomal Bupivacaine?    Liposomal Bupivacaine is a numbing medication that can help you manage your pain after surgery.  This medication is similar to \"novacaine,\" which is often used by the dentist.  Liposomal bupivacaine is released slowly and can help control pain for up to 72 hours.    What is the purpose of Liposomal Bupivacaine?    To manage your pain after surgery    To help you sleep better, take deep breaths, walk more comfortable, and feel up to visiting with others    How is the procedure done?    Liposomal bupivacaine is a medication given by an injection.    It is usually given right before your surgery.  If this is the case, you will be awake or sedated, but you should experience minimal pain during the procedure.    For some people, the injection may be given at the very end of your surgery.  It all depends on the type of surgery and your situation.    The procedure usually takes about 5-15 minutes.  An ultrasound machine will help the anesthesiologist insert it in the right place or the surgeon will inject it under direct vision.     A needle is used to place the numbing medication under your skin.  It provides pain relief by numbing the tissue in the area where your surgeon will make the incision.    What can I expect?    You may experience numbness, tingling, or a feeling of heaviness around the area that was injected.    If you experience any of " the follow symptoms IMMEDIATELY CALL THE REGIONAL ANESTHESIA PAIN SERVICE:    Numbness or tingling occurs in areas other than around the injection site    Blurry vision    Ringing in your ears    A metallic taste in your mouth    PAGE: Dial [phone].  When prompted, enter the following 4-digit ID number:  [id number].  You will be prompted to enter your phone number; and then enter the # sign.  The clinician on call will call you back.    OR    CALL: Dial [phone].  Let the hospital  know that you are having a problem with a nerve block and that you would like to speak to the regional anesthesia pain service right away.    You should not receive any other type of numbing medication within 4 days after receiving liposomal bupivacaine unless your anesthesiologist approves.    Post Operative Instructions: Regional Anesthetic for Lower Extremity with Liposomal Bupivacaine  General Information:   Regional anesthesia is when local anesthetic or  numbing  medication is injected around the nerves to anesthetize or  numb  the area supplied by that set of nerves. It is a type of analgesia used to control pain and decreases the need for narcotics following surgery.    Types of Regional Blocks:  Femoral: A block injected into the groin area of the operative leg of a patient having thigh or knee surgery.  Adductor Canal: A block injected into the mid thigh of the operative leg of a patient having knee or ankle surgery.  Popliteal or Distal Sciatic: A block injected into the back of the knee of the operative leg of patients having foot or ankle surgery.   Ankle:  An anesthetic medication is injected into the ankle of the operative leg of a patient having foot or toe surgery.     Procedure:   The type of anesthesia your doctor used to numb your leg will usually not wear off for 24-48 hours, but may last as long as 72 hours. You should be careful during that period, since it is possible to injure your leg without being  "aware of the injury. While your leg is numb you should:    Use crutches (minimal weight bearing until your motor and strength is completely back to normal)    Avoid striking or bumping your leg    Avoid extreme hot or cold    Discomfort:  You will have a tingling and prickly sensation in your leg as the feeling begins to return; you can also expect some discomfort. The amount of discomfort is unpredictable, but if you have more pain than can be controlled with pain medication, you should notify your physician.     Pain Medicine:   Begin taking your oral pain pills before bedtime and during the night to avoid a sudden onset of pain as part of the block wears off. Do not engage in drinking, driving, or hazardous occupations while taking pain medication.     Safety Tips for Using Crutches    Crutch Fit:    Assume good standing posture with shoulders relaxed and crutch tips 6-8 inches out from the side of the foot.    The underarm pad should fall 2-3 fingers width below the armpit.    The handgrip is positioned level with the wrist to allow 30  flexion at the elbow.    Safety Tips:    Bear weight on your hands, not on your armpits.    Do not add extra padding to the underarm pad. This will, in effect, lengthen the crutches and increase risk of nerve injury.    Wear flat, properly fitting shoes. Do not walk in stocking feet, high heels or slippers.    Household hazards:  --Throw rugs should be removed from floors.  --Stairs should be cleared of obstacles.  --Use extra caution on slippery, highly polished, littered or uneven floor surfaces.  --Check for electric cords.    Check crutch tips for excessive wear and keep wing nuts tight.    While walking, look forward with  head up  and  eyes open.  Take equal length steps.    Use BOTH crutches.    Stairs Sequence:    UP: \"Good\" leg first, followed by  bad  leg, then crutches.    DOWN: Crutches, followed by  bad  leg, \"good\" leg.     Walking with Crutches:    Move both " crutches forward at the same time.    Non-Weight Bearing (NWB):  Hold the involved leg up and swing through the crutches with the involved leg. The involved leg does not touch the floor.    Toe Touch Weight Bearing (TTWB): Move the involved leg forward. Rest it lightly on the floor for balance only. Step through the crutches with the uninvolved leg.    Partial Weight Bearing (PWB): Move the involved leg forward. Step down the weight of the leg only.  Step through the crutches with the uninvolved leg.    Weight Bearing As Tolerated (WBAT): Move the involved leg forward. Put as much pressure through the involved leg as you can tolerate comfortably. Then step through the crutches with the uninvolved leg.

## 2019-01-25 NOTE — BRIEF OP NOTE
[facility] Brief Operative Note    Pre-operative diagnosis: Patella Instability   Post-operative diagnosis Patella instability    Procedure: Procedure(s):  Left Knee Arthroscopy,  Medial Patellofemoral Ligament Reconstruction, Medial Patellotibial Ligament Reconstruction   Surgeon(s): Surgeon(s) and Role:     * Cayla Teran MD - Primary     * Lex Elizabeth PA-C - Assisting   Adeline Diaz MD, PGY2     Estimated blood loss: 10 ml       Specimens: * No specimens in log *     Findings: No acute events. See op note for details.      Plan:   HKB locked at 10' while up  HKB unlocked (90') while sitting   WBAT, crutches as needed    Follow up 2 weeks with Lex Cheng for suture removal     Adeline Diaz MD

## 2019-01-25 NOTE — LETTER
Patient: Gregoria Sung  : 1995  Date of Service: 2019 12:12 PM      To Whom It May Concern:    Gregoria Sung underwent surgery by me at the [facility] and [facility].  I would ask for your help in taking care of his/her wound postop.      The surgical procedure was an MPFL and MPTL reconstruction.    The incisions were closed with a buried absorbable running stitch, and a Dermabond PRINEO Skin Closure System  was applied overtop.     Typically, the Dermabond PRINEO Skin Closure System  is removed around postoperative day 14. The best way to do this is to gently grasp the mesh at one end of the wound and slowly peel it away from the skin along the line of the wound. Take care when peeling the mesh to ensure that you are applying tension to the mesh in a direction that is parallel, and not perpendicular, to the skin surface.    If you are checking the wound before postoperative day 14, trim any free edges of the Dermabond PRINEO System  that are no longer adhered to the skin surface. To do this, grasp the free mesh, apply gentle tension, and trim with a scissors. Inform the patient that they are to remove the mesh on postoperative day 14 using the instructions above.    Once you have performed the general incision cares outlined above, please apply a fresh dressing to the wound using 4x4s and tape.     Should there be any concerns with the incision, please feel free to call our triage line at [phone], option #3, and ask to speak with one of the triage nurses.      Thank you for assisting with the postoperative care of this patient.      Sincerely,           ANGELITO Harrison MD  Professor  [facility]  Department of Orthopaedic Surgery   Pager #: [phone]  Email:  [email or web address]

## 2019-01-27 NOTE — OP NOTE
PREOPERATIVE DIAGNOSIS:   1. Left knee acute on chronic patella dislocation.   2. Excessive knee hyperextension    POSTOPERATIVE DIAGNOSIS:   1.  Left knee acute on chronic patella dislocation.   2.  NO  Cartilage wear in patellofemoral compartment  3.  Knee hyperextension 15      OPERATIONS:   1. left knee exam under anesthesia.   2. Diagnostic arthroscopy.   3. Medial patellofemoral ligament reconstruction using  allograft.   4. Medial patellotibial ligament reconstruction using  allograft.        OPERATORS: Cayla Teran MD; Adeline Diaz MD; ARUNA Harrison    ANESTHESIA:  genreal anethesia supplemented with addcutor nerve block    Exam under anesthesia of right knee revealed range of motion 15/0/155 )(Both sides.)\    Passive patellar mobility revealed 4+ quadrants lateral mobility, 3 quadrants medial mobility, (+)medial patellar tilt test.   .   Arthroscopic findings revealed no fluid upon entry into the joint.   The patient's patellofemoral compartment no cartilage damage.  Lateral patellar maltracking as viewed arthroscopically.     The patient's medial and lateral compartment showed pristine cartilage surfaces when visualized and probed and normal menisci.     DESCRIPTION OF PROCEDURE: Under general anesthesia anesthesia, the patient was positioned supine on the operating room table. The patient's leg was prepped and draped in the usual sterile fashion. A pause was performed identifying the correct lead, the inclusion of a lead apron over the patient as a shield from the flouroscopic unit, and the administration of antibiotics.     A diagnostic arthroscopy was performed using a superior medial parapatellar  portal for inflow and anteromedial and anterolateral portals for instrumentation and visualization respectively. Diagnostic arthroscopy findings as stated above.   At the end of this portion of the procedure, excess fluid was removed from   the knee. A tourniquet, which was in place on the  upper aspect of the patient's leg was elevated to 250 mmHg after Esmarch exsanguination of the leg.     We then made   Medial para-patella incision the approximate length of the patella.  We identified the approximate location of the MPFL origin on the patella and made a small incision down to bone on the superior - medial patella.  We drilled a K-wire medial to lateral across the patella exiting on the lateral side of the patella. This position was found with use of fluoroscopy, we then overdrilled this pin for with a  4 mm cannulated reamer for a a length of 10 mm     We made a second incision distal to the first, from the joint line to tibial tubercle, leaving a skin bridge between the 2 medial incisions.  Turning the C-arm to an AP position,we identified a place on the ant-med tibil approx. 2.5 mm from the joint line, 2-3 mm from the medial border of the patella tendon.  We marked this spot for later use of the tibial attachment of the MPTL.    We next made an incision over the adductor tubercle. We identified the adductor rommel tendon on the adductor tubercle.     Under separate cover, a peroneal graft was brought into the room. We divided the graft into 2 grafts, each approximately 4mm in diameter.  We put leader sutures on both ends of the graft, tuberularizing one end of the graft to fit a 4mm tunnel.     We brought the graft up on to the table. Using the previously place suture as a passing  guide, we threaded the graft into the patellar tunnel.   We secured the graft in 2 ways. The sutures exiting the lateral border of the patella were tied into soft tissues. As the graft exited the medial border of the patella, we sewed the soft tissue into the graft with a box stitch of #1 Vicryl.   We then placed the graft underneath the medial retinaculum counterclockwise around the adductor rommel tendon back underneath the medial retinaculum exiting at the mid portion of the patella.   We placed the knee at 40  degrees of flexion; where the 2 arms of the graft crossed over one another, just distal to the adductor rommel tendon, we placed a suture of #1 Ethibond in place.    We then brought the knee through full range of motion and felt we had a good construct with full motion on the table and <2 quadrants passive lateral mobility with a firm endpoint.  We then placed the knee at 20 degrees of flexion and secured the second arm of the graft into the medial patella with a soft tissue technique.   We then rechecked patella mobility and knee motion.     After the MPFL was in place, we turned attention to the MPTL.  We used the second half of the allograft and sutured it to patella using a soft tissue technique, routing it under the patellar retinaculum, starting at the superior medial border of the patellar tendon.  The axis of this arm of the MPTL was to have the tendon traverse inf-med to sup-lat at a 20-25 degree axis off the vertical plane.  One this was secured, we bent the knee to 90 degrees.  In the pre-designated spot on the anterior tibia (loacted with flour), we placed the drill for a 3.5 PEEK forked anchor in the proximal tibia (20mm from the joint line, 20 mm from the medial border of the patellar tendon. We secured the graft into the tibia.      We then did full PROM of the knee and rechecked medial-lateral patella translation, and were pleased with the reconstruction.    Tourniquet was let down at this point in time. Total tourniquet time was 77 minutes.   The adductor fascia was closed with figure-of-eight sutures of #1 Vicryl. The incision over the medial border of the Patella was closed with an imbricating suture of #1 Vicryl.    All incisions were closed with 2-0 Vicryl on the subcutaneous and a running 3-0 Monocryl.  Pirneo wound closure system, a sterile dressing and a Tubigrip stockinette was put in place.  place.   The patient's knee was placed in a locked hinge brace locked at 10 degrees of flexion. The  patient will be maintained partial weightbearing on Crutches; a Cryo/Cuff will be used postop.     Patient can open up the brace when sitting.    Lex Elizabeth PA-C was an integral part of the case for tissue retraction and preparation of the graft.  There was a resident learner available for the case, but a second set of hands was necessary  to  help manage the limb in the OR, help with tissue retraction to ensure maximum exposure and maximum surgical efficiency, both which promotes best practice and best surgical outcomes.  A PA was an essential part of this case.

## 2019-01-28 ENCOUNTER — TELEPHONE (OUTPATIENT)
Dept: ORTHOPEDICS | Facility: CLINIC | Age: 24
End: 2019-01-28

## 2019-01-28 DIAGNOSIS — Z98.890 STATUS POST KNEE SURGERY: ICD-10-CM

## 2019-01-28 RX ORDER — OXYCODONE HYDROCHLORIDE 5 MG/1
5-10 TABLET ORAL EVERY 6 HOURS PRN
Qty: 20 TABLET | Refills: 0 | Status: SHIPPED | OUTPATIENT
Start: 2019-01-28 | End: 2019-02-04

## 2019-01-28 NOTE — TELEPHONE ENCOUNTER
Gregoria underwent Left knee scope, MPL Reconstruction and patellotibial ligament reconstruction on 1/25/19.  Pt was sent with #20 oxycodone, calling for refill to be mailed.  Rx was mailed with new directions to take 1-2 q6hr prn #20.  Navya Meraz RN

## 2019-01-28 NOTE — TELEPHONE ENCOUNTER
[facility] Call Center    Phone Message    May a detailed message be left on voicemail: yes    Reason for Call: Other: Pt had surgery with Dr. Teran and is calling for a refill of the Oxycodone 5mg and you want it mailed and pt would like a call when this has been mailed     Action Taken: Message routed to:  [facility] ([facility]): Ortho

## 2019-02-04 ENCOUNTER — TRANSFERRED RECORDS (OUTPATIENT)
Dept: HEALTH INFORMATION MANAGEMENT | Facility: CLINIC | Age: 24
End: 2019-02-04

## 2019-02-04 ENCOUNTER — MYC REFILL (OUTPATIENT)
Dept: ORTHOPEDICS | Facility: CLINIC | Age: 24
End: 2019-02-04

## 2019-02-04 DIAGNOSIS — Z98.890 STATUS POST KNEE SURGERY: ICD-10-CM

## 2019-02-05 RX ORDER — OXYCODONE HYDROCHLORIDE 5 MG/1
5 TABLET ORAL EVERY 8 HOURS PRN
Qty: 15 TABLET | Refills: 0 | Status: SHIPPED | OUTPATIENT
Start: 2019-02-05

## 2019-02-22 DIAGNOSIS — Z98.890 S/P LEFT KNEE SURGERY: Primary | ICD-10-CM

## 2019-03-28 ENCOUNTER — TRANSFERRED RECORDS (OUTPATIENT)
Dept: HEALTH INFORMATION MANAGEMENT | Facility: CLINIC | Age: 24
End: 2019-03-28

## 2019-04-08 ENCOUNTER — TELEPHONE (OUTPATIENT)
Dept: ORTHOPEDICS | Facility: CLINIC | Age: 24
End: 2019-04-08

## 2019-04-08 NOTE — TELEPHONE ENCOUNTER
Patient was called due to her cancelling her appointment with Dr. Teran on 4/9/2019.  She was offered and accepted an appointment on 4/30/2019 due to her not being able to make an appointment on 4/16/2019 due to her getting a new job.

## 2020-03-10 ENCOUNTER — HEALTH MAINTENANCE LETTER (OUTPATIENT)
Age: 25
End: 2020-03-10

## 2020-12-27 ENCOUNTER — HEALTH MAINTENANCE LETTER (OUTPATIENT)
Age: 25
End: 2020-12-27

## 2021-04-24 ENCOUNTER — HEALTH MAINTENANCE LETTER (OUTPATIENT)
Age: 26
End: 2021-04-24

## 2021-10-09 ENCOUNTER — HEALTH MAINTENANCE LETTER (OUTPATIENT)
Age: 26
End: 2021-10-09

## 2022-05-16 ENCOUNTER — HEALTH MAINTENANCE LETTER (OUTPATIENT)
Age: 27
End: 2022-05-16

## 2022-09-11 ENCOUNTER — HEALTH MAINTENANCE LETTER (OUTPATIENT)
Age: 27
End: 2022-09-11

## 2023-06-03 ENCOUNTER — HEALTH MAINTENANCE LETTER (OUTPATIENT)
Age: 28
End: 2023-06-03

## (undated) DEVICE — SOL NACL 0.9% IRRIG 3000ML BAG 2B7477

## (undated) DEVICE — SU VICRYL 2-0 CT-2 27" UND J269H

## (undated) DEVICE — DRSG STERI STRIP 1/2X4" R1547

## (undated) DEVICE — SOL WATER IRRIG 1000ML BOTTLE 2F7114

## (undated) DEVICE — SUCTION MANIFOLD NEPTUNE 2 SYS 4 PORT 0702-020-000

## (undated) DEVICE — DRAPE MAYO STAND 23X54 8337

## (undated) DEVICE — ESU GROUND PAD ADULT W/CORD E7507

## (undated) DEVICE — GLOVE PROTEXIS POWDER FREE SMT 6.5  2D72PT65X

## (undated) DEVICE — BNDG ESMARK 6" STERILE LF 820-3612

## (undated) DEVICE — GLOVE GAMMEX NEOPRENE ULTRA SZ 6.5 LF 8513

## (undated) DEVICE — PACK ACL SUPPLEMENT CUSTOM ASC

## (undated) DEVICE — LINEN GOWN XLG 5407

## (undated) DEVICE — TUBING SYSTEM ARTHREX PATIENT REDEUCE AR-6421

## (undated) DEVICE — Device

## (undated) DEVICE — PACK ARTHROSCOPY CUSTOM ASC

## (undated) DEVICE — SU VICRYL 1 CT-2 27" J335H

## (undated) DEVICE — DRAPE C-ARM W/STRAPS 42X72" 07-CA104

## (undated) DEVICE — LINEN ORTHO PACK 5446

## (undated) DEVICE — ESU PENCIL SMOKE EVAC W/ROCKER SWITCH 0703-047-000

## (undated) DEVICE — SU NDL MCGOWAN 1/2 1859-6D

## (undated) DEVICE — PREP CHLORAPREP 26ML TINTED ORANGE  260815

## (undated) RX ORDER — CEFAZOLIN SODIUM 1 G/3ML
INJECTION, POWDER, FOR SOLUTION INTRAMUSCULAR; INTRAVENOUS
Status: DISPENSED
Start: 2019-01-25

## (undated) RX ORDER — ACETAMINOPHEN 325 MG/1
TABLET ORAL
Status: DISPENSED
Start: 2019-01-25

## (undated) RX ORDER — OXYCODONE HYDROCHLORIDE 5 MG/1
TABLET ORAL
Status: DISPENSED
Start: 2019-01-25

## (undated) RX ORDER — FENTANYL CITRATE 50 UG/ML
INJECTION, SOLUTION INTRAMUSCULAR; INTRAVENOUS
Status: DISPENSED
Start: 2019-01-25

## (undated) RX ORDER — PROPOFOL 10 MG/ML
INJECTION, EMULSION INTRAVENOUS
Status: DISPENSED
Start: 2019-01-25

## (undated) RX ORDER — HYDROMORPHONE HYDROCHLORIDE 1 MG/ML
INJECTION, SOLUTION INTRAMUSCULAR; INTRAVENOUS; SUBCUTANEOUS
Status: DISPENSED
Start: 2019-01-25

## (undated) RX ORDER — GABAPENTIN 300 MG/1
CAPSULE ORAL
Status: DISPENSED
Start: 2019-01-25